# Patient Record
Sex: FEMALE | Race: AMERICAN INDIAN OR ALASKA NATIVE
[De-identification: names, ages, dates, MRNs, and addresses within clinical notes are randomized per-mention and may not be internally consistent; named-entity substitution may affect disease eponyms.]

---

## 2018-12-18 ENCOUNTER — HOSPITAL ENCOUNTER (EMERGENCY)
Dept: HOSPITAL 42 - ED | Age: 28
Discharge: HOME | End: 2018-12-18
Payer: MEDICAID

## 2018-12-18 VITALS — RESPIRATION RATE: 18 BRPM

## 2018-12-18 VITALS — TEMPERATURE: 98.4 F | HEART RATE: 82 BPM | OXYGEN SATURATION: 100 %

## 2018-12-18 VITALS — DIASTOLIC BLOOD PRESSURE: 58 MMHG | SYSTOLIC BLOOD PRESSURE: 117 MMHG

## 2018-12-18 VITALS — BODY MASS INDEX: 28.5 KG/M2

## 2018-12-18 DIAGNOSIS — F41.9: ICD-10-CM

## 2018-12-18 DIAGNOSIS — O99.342: Primary | ICD-10-CM

## 2018-12-18 DIAGNOSIS — Z3A.18: ICD-10-CM

## 2018-12-18 LAB
ALBUMIN SERPL-MCNC: 3.7 G/DL (ref 3–4.8)
ALBUMIN/GLOB SERPL: 1.1 {RATIO} (ref 1.1–1.8)
ALT SERPL-CCNC: 28 U/L (ref 7–56)
APPEARANCE UR: CLEAR
APTT BLD: 28.6 SECONDS (ref 25.1–36.5)
AST SERPL-CCNC: 23 U/L (ref 14–36)
BASOPHILS # BLD AUTO: 0.03 K/MM3 (ref 0–2)
BASOPHILS NFR BLD: 0.3 % (ref 0–3)
BILIRUB UR-MCNC: NEGATIVE MG/DL
BUN SERPL-MCNC: 3 MG/DL (ref 7–21)
CALCIUM SERPL-MCNC: 8.9 MG/DL (ref 8.4–10.5)
COLOR UR: (no result)
EOSINOPHIL # BLD: 0.1 10*3/UL (ref 0–0.7)
EOSINOPHIL NFR BLD: 1.1 % (ref 1.5–5)
ERYTHROCYTE [DISTWIDTH] IN BLOOD BY AUTOMATED COUNT: 14.6 % (ref 11.5–14.5)
GFR NON-AFRICAN AMERICAN: > 60
GLUCOSE UR STRIP-MCNC: NEGATIVE MG/DL
GRANULOCYTES # BLD: 8.08 10*3/UL (ref 1.4–6.5)
GRANULOCYTES NFR BLD: 73.2 % (ref 50–68)
HCG,QUALITATIVE URINE: POSITIVE
HGB BLD-MCNC: 11.1 G/DL (ref 12–16)
INR PPP: 1.01
LEUKOCYTE ESTERASE UR-ACNC: NEGATIVE LEU/UL
LYMPHOCYTES # BLD: 2.1 10*3/UL (ref 1.2–3.4)
LYMPHOCYTES NFR BLD AUTO: 19 % (ref 22–35)
MCH RBC QN AUTO: 29.1 PG (ref 25–35)
MCHC RBC AUTO-ENTMCNC: 33.7 G/DL (ref 31–37)
MCV RBC AUTO: 86.1 FL (ref 80–105)
MONOCYTES # BLD AUTO: 0.7 10*3/UL (ref 0.1–0.6)
MONOCYTES NFR BLD: 6.4 % (ref 1–6)
PH UR STRIP: 7.5 [PH] (ref 4.7–8)
PLATELET # BLD: 309 10^3/UL (ref 120–450)
PMV BLD AUTO: 9.4 FL (ref 7–11)
PROT UR STRIP-MCNC: NEGATIVE MG/DL
PROTHROMBIN TIME: 11.5 SECONDS (ref 9.4–12.5)
RBC # BLD AUTO: 3.82 10^6/UL (ref 3.5–6.1)
RBC # UR STRIP: NEGATIVE /UL
SP GR UR STRIP: 1.01 (ref 1–1.03)
UROBILINOGEN UR STRIP-ACNC: 0.2 E.U./DL
WBC # BLD AUTO: 11 10^3/UL (ref 4.5–11)

## 2018-12-18 PROCEDURE — 85610 PROTHROMBIN TIME: CPT

## 2018-12-18 PROCEDURE — 85730 THROMBOPLASTIN TIME PARTIAL: CPT

## 2018-12-18 PROCEDURE — 85025 COMPLETE CBC W/AUTO DIFF WBC: CPT

## 2018-12-18 PROCEDURE — 76815 OB US LIMITED FETUS(S): CPT

## 2018-12-18 PROCEDURE — 80053 COMPREHEN METABOLIC PANEL: CPT

## 2018-12-18 PROCEDURE — 99283 EMERGENCY DEPT VISIT LOW MDM: CPT

## 2018-12-18 PROCEDURE — 96360 HYDRATION IV INFUSION INIT: CPT

## 2018-12-18 PROCEDURE — 86900 BLOOD TYPING SEROLOGIC ABO: CPT

## 2018-12-18 PROCEDURE — 84702 CHORIONIC GONADOTROPIN TEST: CPT

## 2018-12-18 PROCEDURE — 84703 CHORIONIC GONADOTROPIN ASSAY: CPT

## 2018-12-18 PROCEDURE — 86850 RBC ANTIBODY SCREEN: CPT

## 2018-12-18 PROCEDURE — 81003 URINALYSIS AUTO W/O SCOPE: CPT

## 2018-12-18 PROCEDURE — 87086 URINE CULTURE/COLONY COUNT: CPT

## 2018-12-18 NOTE — US
Indication: OB, 19 weeks pregnant, possible bleeding



Comparison: None available



Technique: Real-time ultrasound was performed through the pelvis. 



Findings: 



There is a single living fetus in transverse presentation.  Posterior 

placenta.  The placenta is not previa.  There are no adnexal masses 

or cysts evident. 



Measurements and calculations: 



Fetus has a composite sonographic age of 18 weeks 5 days.  This 

calculation is based on the biparietal diameter, head circumference, 

abdominal circumference, and femur length. 



Estimated fetal heart rate 152.3 beats per min. 



Estimated fetal weight 268.01 g.



Impression: 



Single living fetus with a composite sonographic age of 18 weeks 5 

days. 



Estimated fetal heart rate 152.3 beats per min. 



The study was performed for the emergent evaluation of possible 

bleeding, and the whole anatomic survey of the fetus was not 

performed. This should be performed on an outpatient elective basis 

as clinically warranted.

## 2018-12-18 NOTE — ED PDOC
Arrival/HPI





- General


Chief Complaint: Anxiety


Time Seen by Provider: 18 13:15


Historian: Patient





- History of Present Illness


Narrative History of Present Illness (Text): 


28 year old A0 18 week pregnant female presents to the emergency department 

c/o anxiety x 3 hours. Patient had a bowel movement at home and when she wiped, 

she saw Blood on the tissue paper. Patient is unsure whether the blood was from 

her rectum or vagina. No episodes of bleeding since. Patient began to experience

increased anxiety about the incident, prompting her visit to the ED. Patient has

established OB-GYN follow up and has had multiple ultrasounds throughout her 

pregnancy, no complications thus far. Taking prenatal vitamins. Denies fever, 

chills, abdominal pain, N/V, urinary symptoms, chest pain, SOB, wekaness, 

numbness, paresthesias, back pain, or any other associated complaints. 





Past Medical History





- Provider Review


Nursing Documentation Reviewed: Yes





- Past History


Past History: No Previous





Family/Social History





- Physician Review


Nursing Documentation Reviewed: Yes


Family/Social History: No Known Family HX





Allergies/Home Meds


Allergies/Adverse Reactions: 


Allergies





acetaminophen [From Midol] Allergy (Verified 18 13:46)


   URTICARIA


pamabrom [From Midol] Allergy (Verified 18 13:46)


   URTICARIA











Review of Systems





- Physician Review


All systems were reviewed & negative as marked: Yes





- Review of Systems


Constitutional: Normal.  absent: Fatigue, Fevers


Eyes: Normal.  absent: Vision Changes


ENT: Normal.  absent: Sore Throat, Sinus Congestion


Respiratory: Normal.  absent: SOB, Cough, Sputum


Cardiovascular: Normal.  absent: Chest Pain, Palpitations, Syncope


Gastrointestinal: Normal, Other (blood on toilet tissue after wiping).  absent: 

Abdominal Pain, Nausea, Vomiting, Hematochezia


Genitourinary Female: Normal


Musculoskeletal: Normal.  absent: Arthralgias, Back Pain


Skin: Normal.  absent: Rash


Neurological: Normal.  absent: Headache, Dizziness, Focal Weakness, Gait 

Changes, Disequilibrium


Endocrine: Normal.  absent: Diaphoresis


Hemo/Lymphatic: Normal.  absent: Adenopathy


Psychiatric: Anxiety





Physical Exam


Vital Signs Reviewed: Yes





Vital Signs











  Temp Pulse Resp BP Pulse Ox


 


 18 13:12  98.2 F  87  18  121/79  99











Temperature: Afebrile


Blood Pressure: Normal


Pulse: Regular


Respiratory Rate: Normal


Appearance: Positive for: Well-Appearing, Non-Toxic, Comfortable


Pain Distress: None


Mental Status: Positive for: Alert and Oriented X 3





- Systems Exam


Head: Present: Atraumatic, Normocephalic


Pupils: Present: PERRL


Extroacular Muscles: Present: EOMI


Conjunctiva: Present: Normal


Ears: Present: Normal, NORMAL TM, Normal Canal.  No: Erythema


Mouth: Present: Moist Mucous Membranes


Pharnyx: Present: Normal.  No: ERYTHEMA, EXUDATE, TONSILS ENLARGED


Nose (External): Present: Atraumatic


Nose (Internal): Present: Normal Inspection


Neck: Present: Normal Range of Motion.  No: Meningeal Signs, MIDLINE TENDERNESS,

Paraspinal Tenderness, Lymphadenopathy


Respiratory/Chest: Present: Clear to Auscultation, Good Air Exchange.  No: 

Respiratory Distress, Accessory Muscle Use


Cardiovascular: Present: Regular Rate and Rhythm, Normal S1, S2, Peripheal 

Pulses Present.  No: Murmurs


Abdomen: Present: Normal Bowel Sounds, Other (pregnant).  No: Tenderness, 

Distention, Peritoneal Signs, Rebound, Guarding


Rectal: Present: Other (brown stool).  No: Occult Blood, Rectal Tenderness, 

Gross Blood, Hemorrhoids


Genitourinary/Pelvic Exam: Present: Normal External Genitalia, Cervical os 

Closed.  No: Vaginal Discharge, Vaginal Bleeding, Vaginal Lesions, Adenexal 

Tenderness, Cervical Motion Tendernes, Odor


Back: Present: Normal Inspection.  No: CVA Tenderness, Midline Tenderness, 

Paraspinal Tenderness


Upper Extremity: Present: Normal Inspection, Normal ROM, NORMAL PULSES, 

Neurovascularly Intact, Capillary Refill < 2s.  No: Cyanosis, Edema


Lower Extremity: Present: Normal Inspection, NORMAL PULSES, Normal ROM, 

Neurovascularly Intact, Capillary Refill < 2 s.  No: Edema, CALF TENDERNESS


Neurological: Present: GCS=15, CN II-XII Intact, Speech Normal, Motor Func 

Grossly Intact, Normal Sensory Function, Gait Normal


Skin: Present: Warm, Dry, Normal Color.  No: Rashes


Lymphatic: No: Cervical Adenopathy


Psychiatric: Present: Alert, Oriented x 3, Normal Insight, Normal Concentration,

Normal Affect, Normal Mood





Medical Decision Making


ED Course and Treatment: 


Initial Plan:


* CBC, CMP


* Hcg quant


* UA, culture


* Type and Screen


* TV US


* IVF





CBC: hgb 11.1, patient made aware, advised to f/u with PMD


CMP: unremarkable


UA; unremarkable


TV US: live IUP at 18 weeks


Type and Screen: B +, no indication for Rhogam 





Patient reports complete resolution of lightheadedness and anxiety with IVF. Has

no physical complaints at this time. Requesting discharge home.





Diagnostic testing results and plan of care discussed with patient, and strict 

instructions given regarding prescriptions, importance of follow up, and signs t

o return to Emergency Department, to include abdominal pain, vaginal bleeding, 

headache, dizziness, or any other new/worsening symptoms. Patient verbalizes 

understanding of discussion.  Patient A&Ox3, ambulating with steady gait, stable

for discharge home.





- RAD Interpretation


Narrative RAD Interpretations (Text): 





18 17:29


Findings: 


There is a single living fetus in transverse presentation.  Posterior placenta. 

The placenta is not previa.  There are no adnexal masses or cysts evident. 


Measurements and calculations: 


Fetus has a composite sonographic age of 18 weeks 5 days.  This calculation is 

based on the biparietal diameter, head circumference, abdominal circumference, 

and femur length. 


Estimated fetal heart rate 152.3 beats per min. 


Estimated fetal weight 268.01 g.


Impression: 


Single living fetus with a composite sonographic age of 18 weeks 5 days. 


Estimated fetal heart rate 152.3 beats per min. 


The study was performed for the emergent evaluation of possible bleeding, and 

the whole anatomic survey of the fetus was not performed. This should be 

performed on an outpatient elective basis as clinically warranted.





Disposition/Present on Arrival





- Present on Arrival


Any Indicators Present on Arrival: No


History of DVT/PE: No


History of Uncontrolled Diabetes: No


Urinary Catheter: No


History of Decub. Ulcer: No





- Disposition


Have Diagnosis and Disposition been Completed?: Yes


Diagnosis: 


 Pregnant and not yet delivered in second trimester





Disposition: HOME/ ROUTINE


Disposition Time: 17:30


Patient Plan: Discharge


Condition: IMPROVED


Discharge Instructions (ExitCare):  Prenatal Care


Additional Instructions: 


Continue taking prenatal vitamins


Increase fluids to stay hydrated


Followup with OBGYN within 2 days


Followup with primary doctor within 2 days


Return to ER for any new/worsening symptoms


Referrals: 


[facility]Outside] - Follow up with primary


Leonel Ramos MD [Staff Provider] - Follow up with primary


Forms:  [employer] (English), WORK NOTE